# Patient Record
Sex: MALE | Race: WHITE | NOT HISPANIC OR LATINO | URBAN - METROPOLITAN AREA
[De-identification: names, ages, dates, MRNs, and addresses within clinical notes are randomized per-mention and may not be internally consistent; named-entity substitution may affect disease eponyms.]

---

## 2020-12-17 ENCOUNTER — IMPORTED ENCOUNTER (OUTPATIENT)
Dept: URBAN - METROPOLITAN AREA CLINIC 50 | Facility: CLINIC | Age: 75
End: 2020-12-17

## 2021-01-04 ENCOUNTER — IMPORTED ENCOUNTER (OUTPATIENT)
Dept: URBAN - METROPOLITAN AREA CLINIC 50 | Facility: CLINIC | Age: 76
End: 2021-01-04

## 2021-04-17 ASSESSMENT — TONOMETRY
OS_IOP_MMHG: 17
OS_IOP_MMHG: 18
OD_IOP_MMHG: 16
OD_IOP_MMHG: 15

## 2021-04-17 ASSESSMENT — VISUAL ACUITY
OD_CC: 20/25+1
OD_CC: 20/50-
OD_OTHER: 20/200. >20/400.
OS_CC: 20/20-2
OD_BAT: 20/200
OS_OTHER: 20/80. >20/400.
OS_BAT: 20/80
OD_CC: J2
OD_CC: 20/40
OS_CC: 20/30+2
OS_CC: 20/30-
OD_PH: 20/25-
OS_CC: J2

## 2022-01-26 ENCOUNTER — PREPPED CHART (OUTPATIENT)
Dept: URBAN - METROPOLITAN AREA CLINIC 49 | Facility: CLINIC | Age: 77
End: 2022-01-26

## 2022-01-26 NOTE — PATIENT DISCUSSION
"""Discussed blepharitis diagnosis with patient. Educated patient on proper lid hygiene and the use of warm compresses.  ""."

## 2022-02-04 ENCOUNTER — COMPREHENSIVE EXAM (OUTPATIENT)
Dept: URBAN - METROPOLITAN AREA CLINIC 49 | Facility: CLINIC | Age: 77
End: 2022-02-04

## 2022-02-04 DIAGNOSIS — H04.123: ICD-10-CM

## 2022-02-04 DIAGNOSIS — H25.13: ICD-10-CM

## 2022-02-04 DIAGNOSIS — Z01.01: ICD-10-CM

## 2022-02-04 DIAGNOSIS — E11.9: ICD-10-CM

## 2022-02-04 DIAGNOSIS — Z79.4: ICD-10-CM

## 2022-02-04 PROCEDURE — 92014 COMPRE OPH EXAM EST PT 1/>: CPT

## 2022-02-04 PROCEDURE — 92015 DETERMINE REFRACTIVE STATE: CPT

## 2022-02-04 ASSESSMENT — TONOMETRY
OD_IOP_MMHG: 16
OS_IOP_MMHG: 16

## 2022-02-04 ASSESSMENT — VISUAL ACUITY
OD_GLARE: 20/60
OS_CC: 20/25
OS_GLARE: 20/60
OD_CC: 20/25
OU_CC: J1+
OD_CC: J1
OU_CC: 20/25
OS_CC: J1

## 2022-09-30 ENCOUNTER — EMERGENCY VISIT (OUTPATIENT)
Dept: URBAN - METROPOLITAN AREA CLINIC 49 | Facility: CLINIC | Age: 77
End: 2022-09-30

## 2022-09-30 DIAGNOSIS — H43.12: ICD-10-CM

## 2022-09-30 PROCEDURE — 92014 COMPRE OPH EXAM EST PT 1/>: CPT

## 2022-09-30 ASSESSMENT — VISUAL ACUITY
OD_CC: 20/30
OS_CC: 20/30

## 2022-09-30 ASSESSMENT — TONOMETRY
OD_IOP_MMHG: 15
OS_IOP_MMHG: 16

## 2022-09-30 NOTE — PATIENT DISCUSSION
The patient presents with a vitreous hemorrhage, but no evidence of retinal detachment. Frequently vitreous hemorrhage may occur in conjunction with a posterior vitreous detachment and retinal tear. The view of the retina was somewhat obscured by the blood.  As such, I recommended a prompt consultation with a vitreoretinal specialist.

## 2022-10-28 ENCOUNTER — FOLLOW UP (OUTPATIENT)
Dept: URBAN - METROPOLITAN AREA CLINIC 49 | Facility: CLINIC | Age: 77
End: 2022-10-28

## 2022-10-28 DIAGNOSIS — H25.13: ICD-10-CM

## 2022-10-28 DIAGNOSIS — H43.12: ICD-10-CM

## 2022-10-28 DIAGNOSIS — H04.123: ICD-10-CM

## 2022-10-28 PROCEDURE — 92012 INTRM OPH EXAM EST PATIENT: CPT

## 2022-10-28 ASSESSMENT — VISUAL ACUITY
OD_CC: 20/25-1
OS_CC: 20/25-1
OU_CC: 20/25

## 2022-10-28 ASSESSMENT — TONOMETRY
OS_IOP_MMHG: 13
OD_IOP_MMHG: 13

## 2022-10-28 NOTE — PATIENT DISCUSSION
Patient was last seen two weeks ago at [facility]. Patient scheduled with [facility] on Monday 10/31/2022 and would like to wait until that appointment to assess.

## 2024-02-27 ENCOUNTER — COMPREHENSIVE EXAM (OUTPATIENT)
Dept: URBAN - METROPOLITAN AREA CLINIC 49 | Facility: LOCATION | Age: 79
End: 2024-02-27

## 2024-02-27 DIAGNOSIS — Z01.00: ICD-10-CM

## 2024-02-27 DIAGNOSIS — H52.13: ICD-10-CM

## 2024-02-27 PROCEDURE — 92014 COMPRE OPH EXAM EST PT 1/>: CPT

## 2024-02-27 PROCEDURE — 92015 DETERMINE REFRACTIVE STATE: CPT

## 2024-02-27 ASSESSMENT — VISUAL ACUITY
OD_GLARE: 20/400
OS_GLARE: 20/100
OS_GLARE: 20/70
OS_SC: HM @ 6FT
OD_SC: HM @ 6FT
OD_GLARE: 20/100
OU_CC: J3@16"
OS_CC: 20/25
OD_CC: 20/40+2
OU_CC: 20/25
OU_SC: HM @ 6FT

## 2024-02-27 ASSESSMENT — TONOMETRY
OS_IOP_MMHG: 15
OD_IOP_MMHG: 15

## 2024-04-05 ENCOUNTER — PRE-OP/H&P (OUTPATIENT)
Dept: URBAN - METROPOLITAN AREA CLINIC 49 | Facility: LOCATION | Age: 79
End: 2024-04-05

## 2024-04-05 DIAGNOSIS — H25.13: ICD-10-CM

## 2024-04-05 PROCEDURE — 92134 CPTRZ OPH DX IMG PST SGM RTA: CPT | Mod: NC

## 2024-04-05 PROCEDURE — PREOP PRE OP VISIT

## 2024-04-05 PROCEDURE — 92025CV CORNEAL TOPOGRAPHY, CV

## 2024-04-05 PROCEDURE — 92136 OPHTHALMIC BIOMETRY: CPT

## 2024-04-05 ASSESSMENT — VISUAL ACUITY
OS_CC: 20/25-2
OD_CC: 20/25-2

## 2024-04-05 ASSESSMENT — KERATOMETRY
OD_AXISANGLE_DEGREES: 20
OS_K1POWER_DIOPTERS: 48.75
OS_AXISANGLE2_DEGREES: 55
OS_AXISANGLE_DEGREES: 145
OD_K2POWER_DIOPTERS: 45.62
OD_AXISANGLE2_DEGREES: 110
OD_K1POWER_DIOPTERS: 47.25
OS_K2POWER_DIOPTERS: 45.75

## 2024-04-05 ASSESSMENT — TONOMETRY
OD_IOP_MMHG: 15
OS_IOP_MMHG: 15

## 2024-04-09 ASSESSMENT — KERATOMETRY
OS_AXISANGLE_DEGREES: 145
OS_K2POWER_DIOPTERS: 45.75
OS_AXISANGLE2_DEGREES: 55
OD_AXISANGLE_DEGREES: 20
OD_K2POWER_DIOPTERS: 45.62
OD_K1POWER_DIOPTERS: 47.25
OD_AXISANGLE2_DEGREES: 110
OS_K1POWER_DIOPTERS: 48.75

## 2024-04-10 ENCOUNTER — SURGERY/PROCEDURE (OUTPATIENT)
Dept: URBAN - METROPOLITAN AREA SURGERY 16 | Facility: SURGERY | Age: 79
End: 2024-04-10

## 2024-04-10 DIAGNOSIS — H25.11: ICD-10-CM

## 2024-04-10 PROBLEM — Z96.1: Noted: 2024-04-10

## 2024-04-10 PROCEDURE — 99199PCV CUSTOM VISION

## 2024-04-10 PROCEDURE — 66984CV REMOVE CATARACT, INSERT LENS, CUSTOM VISION

## 2024-04-10 PROCEDURE — 68841 INSJ RX ELUT IMPLT LAC CANAL: CPT | Mod: 51,RT

## 2024-04-11 ENCOUNTER — POST-OP (OUTPATIENT)
Dept: URBAN - METROPOLITAN AREA CLINIC 49 | Facility: LOCATION | Age: 79
End: 2024-04-11

## 2024-04-11 DIAGNOSIS — Z98.41: ICD-10-CM

## 2024-04-11 DIAGNOSIS — H25.11: ICD-10-CM

## 2024-04-11 PROCEDURE — 99024 POSTOP FOLLOW-UP VISIT: CPT

## 2024-04-11 ASSESSMENT — VISUAL ACUITY
OD_PH: 20/50
OD_SC: 20/100

## 2024-04-11 ASSESSMENT — TONOMETRY: OD_IOP_MMHG: 16

## 2024-04-18 ENCOUNTER — POST OP/EVAL OF SECOND EYE (OUTPATIENT)
Dept: URBAN - METROPOLITAN AREA CLINIC 49 | Facility: LOCATION | Age: 79
End: 2024-04-18

## 2024-04-18 DIAGNOSIS — H25.11: ICD-10-CM

## 2024-04-18 DIAGNOSIS — H25.12: ICD-10-CM

## 2024-04-18 DIAGNOSIS — Z98.41: ICD-10-CM

## 2024-04-18 PROCEDURE — 99024 POSTOP FOLLOW-UP VISIT: CPT

## 2024-04-18 ASSESSMENT — TONOMETRY
OS_IOP_MMHG: 13
OD_IOP_MMHG: 13

## 2024-04-18 ASSESSMENT — VISUAL ACUITY
OS_CC: 20/25-2
OS_GLARE: 20/70
OD_SC: 20/30+2
OD_SC: J5@17"
OD_SC: J3@23"
OD_PH: 20/25
OS_GLARE: 20/50

## 2024-04-24 ENCOUNTER — SURGERY/PROCEDURE (OUTPATIENT)
Dept: URBAN - METROPOLITAN AREA SURGERY 16 | Facility: SURGERY | Age: 79
End: 2024-04-24

## 2024-04-24 DIAGNOSIS — H25.12: ICD-10-CM

## 2024-04-24 PROBLEM — Z96.1: Noted: 2024-04-24

## 2024-04-24 PROBLEM — Z96.1: Noted: 2024-04-10

## 2024-04-24 PROCEDURE — 68841 INSJ RX ELUT IMPLT LAC CANAL: CPT | Mod: 51,LT,79,LT

## 2024-04-24 PROCEDURE — 99199PCV CUSTOM VISION

## 2024-04-24 PROCEDURE — 66984CV REMOVE CATARACT, INSERT LENS, CUSTOM VISION: Mod: 79,LT

## 2024-04-25 ENCOUNTER — POST-OP (OUTPATIENT)
Dept: URBAN - METROPOLITAN AREA CLINIC 49 | Facility: LOCATION | Age: 79
End: 2024-04-25

## 2024-04-25 DIAGNOSIS — Z96.1: ICD-10-CM

## 2024-04-25 DIAGNOSIS — Z98.42: ICD-10-CM

## 2024-04-25 PROCEDURE — 99024 POSTOP FOLLOW-UP VISIT: CPT

## 2024-04-25 ASSESSMENT — TONOMETRY: OS_IOP_MMHG: 16

## 2024-04-25 ASSESSMENT — VISUAL ACUITY: OS_SC: 20/20-1

## 2024-05-03 ENCOUNTER — POST-OP (OUTPATIENT)
Dept: URBAN - METROPOLITAN AREA CLINIC 49 | Facility: LOCATION | Age: 79
End: 2024-05-03

## 2024-05-03 DIAGNOSIS — Z98.42: ICD-10-CM

## 2024-05-03 ASSESSMENT — VISUAL ACUITY
OS_SC: 20/20-1
OD_SC: 20/20-2

## 2024-05-03 ASSESSMENT — TONOMETRY
OS_IOP_MMHG: 16
OD_IOP_MMHG: 16

## 2024-05-23 ENCOUNTER — POST-OP (OUTPATIENT)
Dept: URBAN - METROPOLITAN AREA CLINIC 49 | Facility: LOCATION | Age: 79
End: 2024-05-23

## 2024-05-23 DIAGNOSIS — H43.812: ICD-10-CM

## 2024-05-23 DIAGNOSIS — H26.493: ICD-10-CM

## 2024-05-23 DIAGNOSIS — E11.9: ICD-10-CM

## 2024-05-23 DIAGNOSIS — H52.4: ICD-10-CM

## 2024-05-23 DIAGNOSIS — Z98.42: ICD-10-CM

## 2024-05-23 DIAGNOSIS — H04.123: ICD-10-CM

## 2024-05-23 ASSESSMENT — VISUAL ACUITY
OS_SC: J3 @ 20"
OS_SC: 20/20
OD_SC: 20/20-2
OD_SC: J4 @ 20"

## 2024-05-23 ASSESSMENT — TONOMETRY
OS_IOP_MMHG: 14
OD_IOP_MMHG: 14

## 2024-05-23 ASSESSMENT — KERATOMETRY
OS_AXISANGLE2_DEGREES: 50
OD_AXISANGLE2_DEGREES: 125
OS_K1POWER_DIOPTERS: 48.25
OD_K1POWER_DIOPTERS: 47.00
OS_AXISANGLE_DEGREES: 140
OD_K2POWER_DIOPTERS: 45.50
OD_AXISANGLE_DEGREES: 35
OS_K2POWER_DIOPTERS: 45.75

## 2024-07-15 ENCOUNTER — EMERGENCY VISIT (OUTPATIENT)
Dept: URBAN - METROPOLITAN AREA CLINIC 53 | Facility: CLINIC | Age: 79
End: 2024-07-15

## 2024-07-15 DIAGNOSIS — H04.123: ICD-10-CM

## 2024-07-15 DIAGNOSIS — H26.493: ICD-10-CM

## 2024-07-15 DIAGNOSIS — H43.812: ICD-10-CM

## 2024-07-15 DIAGNOSIS — Z96.1: ICD-10-CM

## 2024-07-15 PROCEDURE — 99214 OFFICE O/P EST MOD 30 MIN: CPT

## 2024-07-15 ASSESSMENT — KERATOMETRY
OD_AXISANGLE2_DEGREES: 125
OD_K2POWER_DIOPTERS: 45.50
OD_K1POWER_DIOPTERS: 47.00
OS_AXISANGLE2_DEGREES: 50
OS_K2POWER_DIOPTERS: 45.75
OS_K1POWER_DIOPTERS: 48.25
OS_AXISANGLE_DEGREES: 140
OD_AXISANGLE_DEGREES: 35

## 2024-07-15 ASSESSMENT — VISUAL ACUITY
OS_GLARE: 20/40
OD_GLARE: 20/25
OS_SC: 20/40+2
OS_PH: 20/20-2
OD_GLARE: 20/25
OS_GLARE: 20/50+1
OD_SC: 20/20-2

## 2024-07-15 ASSESSMENT — TONOMETRY
OD_IOP_MMHG: 15
OS_IOP_MMHG: 15

## 2025-03-06 ENCOUNTER — COMPREHENSIVE EXAM (OUTPATIENT)
Age: 80
End: 2025-03-06

## 2025-03-06 DIAGNOSIS — E11.9: ICD-10-CM

## 2025-03-06 DIAGNOSIS — H04.123: ICD-10-CM

## 2025-03-06 DIAGNOSIS — H43.812: ICD-10-CM

## 2025-03-06 DIAGNOSIS — Z01.00: ICD-10-CM

## 2025-03-06 DIAGNOSIS — H52.4: ICD-10-CM

## 2025-03-06 DIAGNOSIS — H26.493: ICD-10-CM

## 2025-03-06 PROCEDURE — 92015 DETERMINE REFRACTIVE STATE: CPT

## 2025-03-06 PROCEDURE — 92014 COMPRE OPH EXAM EST PT 1/>: CPT
